# Patient Record
Sex: MALE | URBAN - METROPOLITAN AREA
[De-identification: names, ages, dates, MRNs, and addresses within clinical notes are randomized per-mention and may not be internally consistent; named-entity substitution may affect disease eponyms.]

---

## 2023-01-04 ENCOUNTER — APPOINTMENT (RX ONLY)
Dept: URBAN - METROPOLITAN AREA CLINIC 169 | Facility: CLINIC | Age: 78
Setting detail: DERMATOLOGY
End: 2023-01-04

## 2023-01-04 PROBLEM — C44.329 SQUAMOUS CELL CARCINOMA OF SKIN OF OTHER PARTS OF FACE: Status: ACTIVE | Noted: 2023-01-04

## 2023-01-04 PROCEDURE — ? CONSULTATION FOR MOHS SURGERY

## 2023-01-04 PROCEDURE — 99202 OFFICE O/P NEW SF 15 MIN: CPT

## 2023-01-04 NOTE — PROCEDURE: CONSULTATION FOR MOHS SURGERY
Detail Level: Detailed
X Size Of Lesion In Cm (Optional): 0
Other Plan: Schedule Mohs surgery
Incorporate Mauc In Note: Yes

## 2023-01-04 NOTE — HPI: MOHS SURGERY CONSULTATION
Has The Cancer Been Biopsied Before?: has been previously biopsied
Who Is Your Referring Provider?: Dr. Cartwright

## 2023-02-20 ENCOUNTER — APPOINTMENT (RX ONLY)
Dept: URBAN - METROPOLITAN AREA CLINIC 169 | Facility: CLINIC | Age: 78
Setting detail: DERMATOLOGY
End: 2023-02-20

## 2023-02-20 PROBLEM — C44.329 SQUAMOUS CELL CARCINOMA OF SKIN OF OTHER PARTS OF FACE: Status: ACTIVE | Noted: 2023-02-20

## 2023-02-20 PROCEDURE — 17312 MOHS ADDL STAGE: CPT

## 2023-02-20 PROCEDURE — ? MOHS SURGERY

## 2023-02-20 PROCEDURE — 17311 MOHS 1 STAGE H/N/HF/G: CPT | Mod: 76

## 2023-02-20 PROCEDURE — 17311 MOHS 1 STAGE H/N/HF/G: CPT

## 2023-02-20 NOTE — PROCEDURE: MOHS SURGERY
today Helical Rim Advancement Flap Text: The defect edges were debeveled with a #15 blade scalpel.  Given the location of the defect and the proximity to free margins (helical rim) a double helical rim advancement flap was deemed most appropriate.  Using a sterile surgical marker, the appropriate advancement flaps were drawn incorporating the defect and placing the expected incisions between the helical rim and antihelix where possible.  The area thus outlined was incised through and through with a #15 scalpel blade.  With a skin hook and iris scissors, the flaps were gently and sharply undermined and freed up.

## 2023-02-20 NOTE — PROCEDURE: MOHS SURGERY
describing exercises, sets, repetitions, frequency and intensity. Exercises/activities include: Mid row, LPD, chin tucks.     Manual Treatments:     10/9: SOR, STM to cervical paraspinals, UTs, lower C/S musculature x 15 min   10/4: SOR, STM to cervical paraspinals and UTs x 10 min   10/1: SOR, STM to cervical paraspinals and UTs, PROM of shoulders     Modalities:    10/9: MHP to neck while pt working with OT x 15 min   10/4: MHP to neck in supine with large bolster x 15 min    Timed Code Treatment Minutes: 28     Total Treatment Minutes:  43    Treatment/Activity Tolerance:  [x] Patient tolerated treatment well [] Patient limited by fatigue  [] Patient limited by pain  [] Patient limited by other medical complications  [x] Other: Next session - measure ROM of rotation     Prognosis: [x] Good [] Fair  [] Poor    Patient Requires Follow-up: [x] Yes  [] No    Plan:   [x] Continue per plan of care [] Alter current plan (see comments)  [] Plan of care initiated [] Hold pending MD visit [] Discharge  Plan for Next Session:  See above    Electronically signed by:  Faustina Walsh, PT , DPT V-Y Plasty Text: The defect edges were debeveled with a #15 scalpel blade.  Given the location of the defect, shape of the defect and the proximity to free margins an V-Y advancement flap was deemed most appropriate.  Using a sterile surgical marker, an appropriate advancement flap was drawn incorporating the defect and placing the expected incisions within the relaxed skin tension lines where possible.    The area thus outlined was incised deep to adipose tissue with a #15 scalpel blade.  The skin margins were undermined to an appropriate distance in all directions utilizing iris scissors.

## 2023-02-22 ENCOUNTER — RX ONLY (OUTPATIENT)
Age: 78
Setting detail: RX ONLY
End: 2023-02-22

## 2023-02-22 RX ORDER — MUPIROCIN 20 MG/G
OINTMENT TOPICAL QDAY
Qty: 22 | Refills: 1 | Status: CANCELLED | COMMUNITY
Start: 2023-02-22

## 2023-02-22 RX ORDER — MUPIROCIN 20 MG/G
OINTMENT TOPICAL QDAY
Qty: 22 | Refills: 1 | Status: ERX | COMMUNITY
Start: 2023-02-22

## 2023-03-02 ENCOUNTER — RX ONLY (OUTPATIENT)
Age: 78
Setting detail: RX ONLY
End: 2023-03-02

## 2023-03-02 RX ORDER — MUPIROCIN 20 MG/G
OINTMENT TOPICAL QDAY
Qty: 44 | Refills: 1 | Status: ERX

## 2024-07-31 NOTE — PROCEDURE: MOHS SURGERY
Patient here today for Testosterone injection. Verbal consent given per patient for injection.    Patient denies any side effects at this time.    200 mg Testosterone given IM without difficulty into R dorsogluteal.  Patient d/c ambulatory at this time.      Date Of Previous Biopsy (Optional): 12/3/22